# Patient Record
Sex: FEMALE | Race: BLACK OR AFRICAN AMERICAN | NOT HISPANIC OR LATINO
[De-identification: names, ages, dates, MRNs, and addresses within clinical notes are randomized per-mention and may not be internally consistent; named-entity substitution may affect disease eponyms.]

---

## 2018-07-22 PROBLEM — Z00.00 ENCOUNTER FOR PREVENTIVE HEALTH EXAMINATION: Status: ACTIVE | Noted: 2018-07-22

## 2018-09-17 ENCOUNTER — APPOINTMENT (OUTPATIENT)
Dept: VASCULAR SURGERY | Facility: CLINIC | Age: 58
End: 2018-09-17
Payer: MEDICAID

## 2018-09-17 VITALS — HEIGHT: 62 IN | WEIGHT: 185 LBS | BODY MASS INDEX: 34.04 KG/M2

## 2018-09-17 PROCEDURE — 99203 OFFICE O/P NEW LOW 30 MIN: CPT

## 2018-09-17 PROCEDURE — 93970 EXTREMITY STUDY: CPT

## 2018-11-19 ENCOUNTER — APPOINTMENT (OUTPATIENT)
Dept: VASCULAR SURGERY | Facility: CLINIC | Age: 58
End: 2018-11-19

## 2019-10-28 ENCOUNTER — APPOINTMENT (OUTPATIENT)
Dept: VASCULAR SURGERY | Facility: CLINIC | Age: 59
End: 2019-10-28

## 2020-03-09 ENCOUNTER — APPOINTMENT (OUTPATIENT)
Dept: VASCULAR SURGERY | Facility: CLINIC | Age: 60
End: 2020-03-09

## 2020-07-15 NOTE — HISTORY OF PRESENT ILLNESS
[FreeTextEntry1] : 59 year old female presents today for a f/u visit with varicose veins to BLE. She does not like the appearance of her veins. Patient has been suffering from varicose veins discomfort for 20 years. Reports L GSV stripping done 12 years ago with return of varicose veins. She denies swelling, discomfort, heaviness. She reports using compression stockings over the past 5 months with no change in veins. No h/o blood clots

## 2020-07-15 NOTE — PHYSICAL EXAM
[Normal Breath Sounds] : Normal breath sounds [JVD] : no jugular venous distention  [Normal Heart Sounds] : normal heart sounds [Normal Rate and Rhythm] : normal rate and rhythm [2+] : right 2+ [Varicose Veins Of Lower Extremities] : present [Ankle Swelling On The Left] : moderate [Alert] : alert [de-identified] : WN/WD, NAD [Calm] : calm [de-identified] : b/l LEs: large bulging veins over calves [de-identified] : NC/AT [de-identified] : + FROM

## 2020-07-17 ENCOUNTER — APPOINTMENT (OUTPATIENT)
Dept: VASCULAR SURGERY | Facility: CLINIC | Age: 60
End: 2020-07-17

## 2020-11-02 ENCOUNTER — APPOINTMENT (OUTPATIENT)
Dept: VASCULAR SURGERY | Facility: CLINIC | Age: 60
End: 2020-11-02
Payer: MEDICARE

## 2020-11-02 PROCEDURE — 99072 ADDL SUPL MATRL&STAF TM PHE: CPT

## 2020-11-02 PROCEDURE — 99213 OFFICE O/P EST LOW 20 MIN: CPT

## 2020-11-02 PROCEDURE — 93970 EXTREMITY STUDY: CPT

## 2020-11-02 NOTE — PROCEDURE
[FreeTextEntry1] : BLE venous doppler done at the office today: Negative DVT. Positive SVT to the LLE VV, VV noted over the BLes.

## 2020-11-02 NOTE — ADDENDUM
[FreeTextEntry1] : This note was written by Leslie Vance on 11/02/2020 acting as scribe for Bernardo iRng M.D.\par \par I, Bernardo Harmon have read and attest that all the information, medical decision making and discharge instructions within are true and accurate.

## 2020-11-02 NOTE — PHYSICAL EXAM
[Normal Breath Sounds] : Normal breath sounds [Respiratory Effort] : normal respiratory effort [Normal Heart Sounds] : normal heart sounds [Normal Rate and Rhythm] : normal rate and rhythm [2+] : left 2+ [Varicose Veins Of Lower Extremities] : bilaterally [Ankle Swelling On The Left] : moderate [Alert] : alert [Oriented to Person] : oriented to person [Oriented to Place] : oriented to place [Oriented to Time] : oriented to time [Calm] : calm [JVD] : no jugular venous distention  [de-identified] : WN/WD, NAD [de-identified] : NC/AT [de-identified] : Supple [de-identified] : + FROM [de-identified] : b/l LEs: large bulging veins over calves

## 2020-11-02 NOTE — ASSESSMENT
[FreeTextEntry1] : 59 year old female presents today for a f/u visit with varicose veins to BLE. On exam, b/l LEs: large bulging veins over calves. \par BLE venous doppler done at the office today: Negative DVT. Positive SVT to the LLE VV, VV noted over the BLes.\par Discussed findings on physical exam and venous doppler , I believe pt will benefit from staged BLE stab phlebectomy to be done at [facility], to which patient has accepted and agreed to proceed.Pictures of BLEs taken at the office today.\par Discussed with pt we will begin first with RLE Stab phlebectomy followed by LLE Stab phlebectomy. The risks, benefits, convalescence and alternatives were reviewed. Numerous questions were asked and answered.In the interim, we will seek authorization from insurance, once approved we will proceed accordingly.  [Arterial/Venous Disease] : arterial/venous disease

## 2020-11-02 NOTE — HISTORY OF PRESENT ILLNESS
[FreeTextEntry1] : 59 year old female presents today for a f/u visit with varicose veins to BLE. She does not like the appearance of her veins. Patient has a long hx (20 years) of  varicose veins discomfort, s/p Reports L GSV stripping x12 years ago with return of varicose veins. She has tried using compression stockings over the past 5 months with no improvement. She notice they have become more prominent and painful. Denies any hx of DVT, ulcerations.

## 2020-11-11 DIAGNOSIS — Z01.818 ENCOUNTER FOR OTHER PREPROCEDURAL EXAMINATION: ICD-10-CM

## 2021-05-14 ENCOUNTER — APPOINTMENT (OUTPATIENT)
Dept: VASCULAR SURGERY | Facility: CLINIC | Age: 61
End: 2021-05-14

## 2021-09-17 ENCOUNTER — APPOINTMENT (OUTPATIENT)
Dept: VASCULAR SURGERY | Facility: CLINIC | Age: 61
End: 2021-09-17

## 2021-10-18 ENCOUNTER — APPOINTMENT (OUTPATIENT)
Dept: VASCULAR SURGERY | Facility: CLINIC | Age: 61
End: 2021-10-18

## 2022-03-21 ENCOUNTER — APPOINTMENT (OUTPATIENT)
Dept: VASCULAR SURGERY | Facility: CLINIC | Age: 62
End: 2022-03-21
Payer: MEDICARE

## 2022-03-21 PROCEDURE — 99213 OFFICE O/P EST LOW 20 MIN: CPT

## 2022-03-21 PROCEDURE — 93970 EXTREMITY STUDY: CPT

## 2022-03-22 RX ORDER — HYDROCHLOROTHIAZIDE 12.5 MG/1
12.5 TABLET ORAL
Refills: 0 | Status: ACTIVE | COMMUNITY

## 2022-03-22 NOTE — ADDENDUM
[FreeTextEntry1] : I, Dr. Steven Beebe, personally performed the evaluation and management (E/M) services for this established patient who presents today with (an) existing condition(s).  That E/M includes conducting the examination, assessing all conditions, and (re)establishing/reinforcing a plan of care.  Today, my ACP, Vesna BRADFORD, was here to observe my evaluation and management services for this condition to be followed going forward.\par \par \par  DISPLAY PLAN FREE TEXT

## 2022-03-22 NOTE — HISTORY OF PRESENT ILLNESS
[FreeTextEntry1] : 61 year old female, previously seen by Dr. Liang  presents today for a f/u visit with varicose veins to BLE. She does not like the appearance of her veins. Patient has a long hx (20 years) of  varicose veins discomfort, s/p Reports L GSV stripping ~ 14 years ago with return of varicose veins. She has tried using compression stockings over the last 2 years with no improvement. She notice they have become more prominent, with occasional pain. Denies any hx of DVT, ulcerations. She was previously scheduled for left leg stab phlebectomy, however had to cancel it since she wasn't cleared for surgery by her PCP.

## 2022-03-22 NOTE — PHYSICAL EXAM
[Normal Breath Sounds] : Normal breath sounds [Respiratory Effort] : normal respiratory effort [Normal Heart Sounds] : normal heart sounds [Normal Rate and Rhythm] : normal rate and rhythm [2+] : left 2+ [Varicose Veins Of Lower Extremities] : bilaterally [Ankle Swelling On The Left] : moderate [Alert] : alert [Oriented to Person] : oriented to person [Oriented to Place] : oriented to place [Oriented to Time] : oriented to time [Calm] : calm [JVD] : no jugular venous distention  [de-identified] : WN/WD, NAD [de-identified] : NC/AT [de-identified] : Supple [de-identified] : + FROM [de-identified] : b/l LEs: large bulging veins over calves

## 2022-03-22 NOTE — PROCEDURE
[FreeTextEntry1] : BLE venous doppler done at the office today: Negative DVT. Left FV reflux, VV noted over the BLes.

## 2022-03-22 NOTE — ASSESSMENT
[Arterial/Venous Disease] : arterial/venous disease [FreeTextEntry1] : 61 year old female, presents today for a f/u visit with varicose veins to BLE. LEs: large bulging veins over calves and anterior to tibia on left leg.\par BLE venous doppler done at the office today: Negative DVT. Left FV reflux, VV noted over the BLes. \par Discussed findings on physical exam and venous doppler. As patient only has intermittent symptoms, and has few concerns about the veins, we discussed the option of stab phlebectomy vs. compression/ observation.\par Patient opted for compression/ observation at this time.\par If she decides to proceed with stab phlebectomy, she will call our office.\par Discussed with pt the risks, benefits, convalescence and alternatives were reviewed. Numerous questions were asked and answered.\par Pt will call us once if she elects to proceed with intervention.

## 2022-03-29 ENCOUNTER — NON-APPOINTMENT (OUTPATIENT)
Age: 62
End: 2022-03-29

## 2022-03-29 ENCOUNTER — APPOINTMENT (OUTPATIENT)
Dept: NEPHROLOGY | Facility: CLINIC | Age: 62
End: 2022-03-29
Payer: MEDICARE

## 2022-03-29 VITALS
HEART RATE: 72 BPM | DIASTOLIC BLOOD PRESSURE: 70 MMHG | SYSTOLIC BLOOD PRESSURE: 122 MMHG | BODY MASS INDEX: 33.86 KG/M2 | HEIGHT: 62 IN | WEIGHT: 184 LBS

## 2022-03-29 DIAGNOSIS — I83.10 VARICOSE VEINS OF UNSPECIFIED LOWER EXTREMITY WITH INFLAMMATION: ICD-10-CM

## 2022-03-29 DIAGNOSIS — Z78.9 OTHER SPECIFIED HEALTH STATUS: ICD-10-CM

## 2022-03-29 DIAGNOSIS — M20.40 OTHER HAMMER TOE(S) (ACQUIRED), UNSPECIFIED FOOT: ICD-10-CM

## 2022-03-29 PROCEDURE — 99204 OFFICE O/P NEW MOD 45 MIN: CPT

## 2022-03-29 NOTE — PHYSICAL EXAM
[General Appearance - Alert] : alert [General Appearance - In No Acute Distress] : in no acute distress [Sclera] : the sclera and conjunctiva were normal [PERRL With Normal Accommodation] : pupils were equal in size, round, and reactive to light [Outer Ear] : the ears and nose were normal in appearance [Neck Appearance] : the appearance of the neck was normal [Neck Cervical Mass (___cm)] : no neck mass was observed [Jugular Venous Distention Increased] : there was no jugular-venous distention [Auscultation Breath Sounds / Voice Sounds] : lungs were clear to auscultation bilaterally [Heart Rate And Rhythm] : heart rate was normal and rhythm regular [Heart Sounds] : normal S1 and S2 [Heart Sounds Gallop] : no gallops [Murmurs] : no murmurs [Heart Sounds Pericardial Friction Rub] : no pericardial rub [Full Pulse] : the pedal pulses are present [Edema] : there was no peripheral edema [Abnormal Walk] : normal gait [Nail Clubbing] : no clubbing  or cyanosis of the fingernails [Musculoskeletal - Swelling] : no joint swelling seen [Motor Tone] : muscle strength and tone were normal [Skin Color & Pigmentation] : normal skin color and pigmentation [Skin Turgor] : normal skin turgor [] : no rash [Deep Tendon Reflexes (DTR)] : deep tendon reflexes were 2+ and symmetric [Sensation] : the sensory exam was normal to light touch and pinprick [No Focal Deficits] : no focal deficits

## 2022-03-29 NOTE — CONSULT LETTER
[Dear  ___] : Dear  [unfilled], [Consult Letter:] : I had the pleasure of evaluating your patient, [unfilled]. [Please see my note below.] : Please see my note below. [Consult Closing:] : Thank you very much for allowing me to participate in the care of this patient.  If you have any questions, please do not hesitate to contact me. [Sincerely,] : Sincerely, [FreeTextEntry2] : Maura [FreeTextEntry3] : Sincerely, \par \par Danilo Bae MD, FACP

## 2022-03-29 NOTE — HISTORY OF PRESENT ILLNESS
[FreeTextEntry1] : 61-year-old woman with a 4-year history of hypertension, currently on amlodipine 2.5 mg daily and losartan 25 mg daily.  She is referred by Dr. Lorenzo because of CKD 3.  Her creatinine has been in the 1.3-1.35 range over the last 4 years without apparent deterioration.  She did have a single creatinine of 1.07 in 2019 but her GFR has consistently been under 60.  She is not diabetic and has not used NSAIDs.  Her WBC count is always in the 11-13,000 range and hemoglobin about 13.  She is prediabetic with a A1c of 5.8.  She has no microalbuminuria, no microscopic hematuria.  She has become very disciplined in her diet recently, abandoning white bread, white rice, soda, etc. she has increased her food intake and she always liked greens.  She has been going to the gym 3 days a week religiously, spending at least 2 hours including 30 minutes of treadmill.  She and her  have raised 3 children of their own and 9 foster children, all of whom are gainfully employed.  She recently saw Dr. Simeon Gracia, and her hemoglobin was stable at 12.9, WBC was 10.9, normal platelets, but her CRP was very high at 18 with normal up to 3, and her ESR was very high at 54 with all immunologic studies normal including double-stranded DNA, RNP, Smith antibody, Sjogren's antibody anticentromere antibodies etc.

## 2022-03-29 NOTE — ASSESSMENT
[FreeTextEntry1] : 61-year-old woman with hypertension that is currently well controlled, CKD 3, no microalbuminuria, now doing wonderfully with diet and exercise particularly in the last month.  She will continue her amlodipine and losartan, and will have labs done in about 6 weeks to include A1c, BMP, cyst statin C, PTH, phosphorus, screening for light chains, urine microalbumin and urinalysis.  I have ordered a renal ultrasound to assess kidney size, echogenicity, and rule out obstructive uropathy.  She knows to avoid all NSAIDs and only take Tylenol for pain.  She will return in 2 months and she will follow up regularly with Dr. SHARIF

## 2022-05-24 ENCOUNTER — APPOINTMENT (OUTPATIENT)
Dept: NEPHROLOGY | Facility: CLINIC | Age: 62
End: 2022-05-24

## 2022-06-02 ENCOUNTER — APPOINTMENT (OUTPATIENT)
Dept: OTOLARYNGOLOGY | Facility: CLINIC | Age: 62
End: 2022-06-02

## 2022-07-05 ENCOUNTER — APPOINTMENT (OUTPATIENT)
Dept: NEPHROLOGY | Facility: CLINIC | Age: 62
End: 2022-07-05

## 2022-09-28 ENCOUNTER — APPOINTMENT (OUTPATIENT)
Dept: NEPHROLOGY | Facility: CLINIC | Age: 62
End: 2022-09-28

## 2022-11-16 ENCOUNTER — APPOINTMENT (OUTPATIENT)
Dept: NEPHROLOGY | Facility: CLINIC | Age: 62
End: 2022-11-16

## 2023-03-29 ENCOUNTER — APPOINTMENT (OUTPATIENT)
Dept: NEPHROLOGY | Facility: CLINIC | Age: 63
End: 2023-03-29
Payer: MEDICARE

## 2023-03-29 VITALS
HEIGHT: 62 IN | BODY MASS INDEX: 34.04 KG/M2 | HEART RATE: 76 BPM | DIASTOLIC BLOOD PRESSURE: 78 MMHG | WEIGHT: 185 LBS | SYSTOLIC BLOOD PRESSURE: 124 MMHG

## 2023-03-29 PROCEDURE — 99214 OFFICE O/P EST MOD 30 MIN: CPT

## 2023-03-29 NOTE — ASSESSMENT
[FreeTextEntry1] : 62-year-old woman who is BP is normal today, but has been elevated at some recent visits.  Her renal function has normalized with no albuminuria.  She will continue her antihypertensive meds, promises to get back to the gym regularly, and will consult with a nutritionist.  She will return in 6 months and follow-up regularly with Dr. SHARIF

## 2023-03-29 NOTE — CONSULT LETTER
[Dear  ___] : Dear  [unfilled], [Consult Letter:] : I had the pleasure of evaluating your patient, [unfilled]. [Please see my note below.] : Please see my note below. [Consult Closing:] : Thank you very much for allowing me to participate in the care of this patient.  If you have any questions, please do not hesitate to contact me. [Sincerely,] : Sincerely, [FreeTextEntry2] : Dr Lorenzo [FreeTextEntry3] : Sincerely, \par \par Danilo Bae MD, FACP\par

## 2023-03-29 NOTE — HISTORY OF PRESENT ILLNESS
[FreeTextEntry1] : 62-year-old woman with a 5-year history of hypertension, managed with amlodipine and losartan.  She was referred by Dr. Lorenzo with mildly diminished renal function.  However, her creatinine has improved steadily from a high of 1.35 down to 1.0, with a BUN of 15 and a GFR in the 60s.  Her K is 4.2 with a CO2 of 29.  Her A1c is 5.5 despite obesity.  Her hemoglobin is good.  Her urinalysis shows no protein.  Her BP was high on a couple of recent visits, initially 160 and on a follow-up visit 140 systolic.  She has had a stressful 6 months having lost a number of childhood friends.  She is also caring for her oldest sister who is ill.  She has been babysitting for her great grandson for the last 6 months.  However she knows she needs to get back to regular exercise, which caused her to lose 13 pounds in the past.  She will also see a nutritionist shortly.

## 2023-10-25 ENCOUNTER — APPOINTMENT (OUTPATIENT)
Dept: NEPHROLOGY | Facility: CLINIC | Age: 63
End: 2023-10-25
Payer: MEDICARE

## 2023-10-25 VITALS
DIASTOLIC BLOOD PRESSURE: 74 MMHG | WEIGHT: 185 LBS | SYSTOLIC BLOOD PRESSURE: 116 MMHG | BODY MASS INDEX: 34.04 KG/M2 | HEIGHT: 62 IN

## 2023-10-25 DIAGNOSIS — N25.81 SECONDARY HYPERPARATHYROIDISM OF RENAL ORIGIN: ICD-10-CM

## 2023-10-25 DIAGNOSIS — I10 ESSENTIAL (PRIMARY) HYPERTENSION: ICD-10-CM

## 2023-10-25 DIAGNOSIS — E66.9 OBESITY, UNSPECIFIED: ICD-10-CM

## 2023-10-25 DIAGNOSIS — N18.30 CHRONIC KIDNEY DISEASE, STAGE 3 UNSPECIFIED: ICD-10-CM

## 2023-10-25 PROCEDURE — 99214 OFFICE O/P EST MOD 30 MIN: CPT

## 2023-10-25 RX ORDER — ROSUVASTATIN CALCIUM 10 MG/1
10 TABLET, FILM COATED ORAL
Refills: 0 | Status: ACTIVE | COMMUNITY

## 2024-02-28 ENCOUNTER — APPOINTMENT (OUTPATIENT)
Dept: NEPHROLOGY | Facility: CLINIC | Age: 64
End: 2024-02-28